# Patient Record
Sex: MALE | Race: WHITE | URBAN - METROPOLITAN AREA
[De-identification: names, ages, dates, MRNs, and addresses within clinical notes are randomized per-mention and may not be internally consistent; named-entity substitution may affect disease eponyms.]

---

## 2020-12-30 ENCOUNTER — APPOINTMENT (RX ONLY)
Dept: URBAN - METROPOLITAN AREA CLINIC 322 | Facility: CLINIC | Age: 40
Setting detail: DERMATOLOGY
End: 2020-12-30

## 2020-12-30 VITALS — TEMPERATURE: 96.4 F

## 2020-12-30 DIAGNOSIS — Z41.9 ENCOUNTER FOR PROCEDURE FOR PURPOSES OTHER THAN REMEDYING HEALTH STATE, UNSPECIFIED: ICD-10-CM

## 2020-12-30 PROCEDURE — ? PLATELET RICH PLASMA INJECTION

## 2020-12-30 ASSESSMENT — LOCATION ZONE DERM: LOCATION ZONE: SCALP

## 2020-12-30 ASSESSMENT — LOCATION DETAILED DESCRIPTION DERM: LOCATION DETAILED: POSTERIOR MID-PARIETAL SCALP

## 2020-12-30 ASSESSMENT — LOCATION SIMPLE DESCRIPTION DERM: LOCATION SIMPLE: POSTERIOR SCALP

## 2020-12-30 NOTE — PROCEDURE: PLATELET RICH PLASMA INJECTION
Post-Care Instructions: After the procedure, take precautions agains sun exposure.  Advised would continue use of minoxidil within 48 hours of treatment.\\n\\nPt paid package of 3 treatments for $1350\\nToday is treatment 1/3

## 2020-12-30 NOTE — PROCEDURE: PLATELET RICH PLASMA INJECTION
Price (Use Numbers Only, No Special Characters Or $): 8358 Price (Use Numbers Only, No Special Characters Or $): 3934

## 2021-03-31 ENCOUNTER — RX ONLY (OUTPATIENT)
Age: 41
Setting detail: RX ONLY
End: 2021-03-31

## 2021-03-31 ENCOUNTER — APPOINTMENT (RX ONLY)
Dept: URBAN - METROPOLITAN AREA CLINIC 322 | Facility: CLINIC | Age: 41
Setting detail: DERMATOLOGY
End: 2021-03-31

## 2021-03-31 VITALS — TEMPERATURE: 97.6 F

## 2021-03-31 DIAGNOSIS — Z41.9 ENCOUNTER FOR PROCEDURE FOR PURPOSES OTHER THAN REMEDYING HEALTH STATE, UNSPECIFIED: ICD-10-CM

## 2021-03-31 PROCEDURE — ? PLATELET RICH PLASMA INJECTION

## 2021-03-31 RX ORDER — FINASTERIDE 1 MG/1
TABLET, FILM COATED ORAL
Qty: 30 | Refills: 11 | COMMUNITY
Start: 2021-03-31

## 2021-03-31 ASSESSMENT — LOCATION DETAILED DESCRIPTION DERM: LOCATION DETAILED: POSTERIOR MID-PARIETAL SCALP

## 2021-03-31 ASSESSMENT — LOCATION SIMPLE DESCRIPTION DERM: LOCATION SIMPLE: POSTERIOR SCALP

## 2021-03-31 ASSESSMENT — LOCATION ZONE DERM: LOCATION ZONE: SCALP

## 2021-03-31 NOTE — PROCEDURE: PLATELET RICH PLASMA INJECTION
Amount Injected At This Location In Cc (Will Not Render If 0): 0
Location #1: scalp
Standard Default Technique For Making Prp: Pt was advised on adequate hydration and eating 24 hours prior to procedure.  Pt was brought into room and in sterile fashion blood was drawn into 22ml eclipse tube with 24g butterfly needle.  Tube was mixed adequately with inversion 5x and was centrifuged at 3300rpm for a total 10 min.  PRP was then drawn up in sterile fashion into 3cc syringes.  Scalp was cleansed with hibiclens and alcohol preparatory wipes.  Injections were made every 1-2cm with 0.1-0.3cc aliquotes intradermally as well as subcutaneous (3-6mm depth) with 30g 1/2in needle.  A total of 12-15cc injected in scalp.  Hemostasis was obtained at all areas.  Scalp was cleansed with wound wash saline.  Pt advised on Ibuprofen for pain and may continue with normal hair regimen.  Call with any complications experienced.
Show Additional Techniques: Yes
Detail Level: Zone
Which Technique?: Default
Treatment Number (Optional): 2
Consent: Written consent obtained, risks reviewed including but not limited to pain, scarring, infection, bruising, HA, vascular events and incomplete improvement.  Patient understands the procedure is cosmetic in nature and will require out of pocket payment.
Post-Care Instructions: After the procedure, take precautions agains sun exposure.  Advised would continue use of minoxidil within 48 hours of treatment.\\n\\nPt paid package of 3 treatments for $1350\\nToday is treatment 2/3; NO CHARGE for today treatment\\n\\nPt given rx to start on finasteride 1mg qday

## 2021-05-12 ENCOUNTER — APPOINTMENT (RX ONLY)
Dept: URBAN - METROPOLITAN AREA CLINIC 322 | Facility: CLINIC | Age: 41
Setting detail: DERMATOLOGY
End: 2021-05-12

## 2021-05-12 DIAGNOSIS — Z41.9 ENCOUNTER FOR PROCEDURE FOR PURPOSES OTHER THAN REMEDYING HEALTH STATE, UNSPECIFIED: ICD-10-CM

## 2021-05-12 PROCEDURE — ? PLATELET RICH PLASMA INJECTION

## 2021-05-12 ASSESSMENT — LOCATION SIMPLE DESCRIPTION DERM: LOCATION SIMPLE: POSTERIOR SCALP

## 2021-05-12 ASSESSMENT — LOCATION ZONE DERM: LOCATION ZONE: SCALP

## 2021-05-12 ASSESSMENT — LOCATION DETAILED DESCRIPTION DERM: LOCATION DETAILED: POSTERIOR MID-PARIETAL SCALP

## 2021-05-12 NOTE — PROCEDURE: PLATELET RICH PLASMA INJECTION
Amount Injected At This Location In Cc (Will Not Render If 0): 0
Location #1: scalp
Standard Default Technique For Making Prp: Pt was advised on adequate hydration and eating 24 hours prior to procedure.  Pt was brought into room and in sterile fashion blood was drawn into 22ml eclipse tube with 24g butterfly needle.  Tube was mixed adequately with inversion 5x and was centrifuged at 3300rpm for a total 10 min.  PRP was then drawn up in sterile fashion into 3cc syringes.  Scalp was cleansed with hibiclens and alcohol preparatory wipes.  Injections were made every 1-2cm with 0.1-0.3cc aliquotes intradermally as well as subcutaneous (3-6mm depth) with 30g 1/2in needle.  A total of 12-15cc injected in scalp.  Hemostasis was obtained at all areas.  Scalp was cleansed with wound wash saline.  Pt advised on Ibuprofen for pain and may continue with normal hair regimen.  Call with any complications experienced.
Show Additional Techniques: Yes
Detail Level: Zone
Which Technique?: Default
Treatment Number (Optional): 3
Consent: Written consent obtained, risks reviewed including but not limited to pain, scarring, infection, bruising, HA, vascular events and incomplete improvement.  Patient understands the procedure is cosmetic in nature and will require out of pocket payment.
Post-Care Instructions: After the procedure, take precautions agains sun exposure.  Advised would continue use of minoxidil within 48 hours of treatment.\\n\\nPt paid package of 3 treatments for $1350\\nToday is treatment 3/3; NO CHARGE for today treatment\\n\\nPt decided he did not want to start on finasteride at this time.  Will attempt another treatment in 3-4 months.